# Patient Record
Sex: FEMALE | Race: BLACK OR AFRICAN AMERICAN | NOT HISPANIC OR LATINO
[De-identification: names, ages, dates, MRNs, and addresses within clinical notes are randomized per-mention and may not be internally consistent; named-entity substitution may affect disease eponyms.]

---

## 2017-10-26 ENCOUNTER — HOSPITAL (OUTPATIENT)
Dept: OTHER | Age: 37
End: 2017-10-26
Attending: INTERNAL MEDICINE

## 2018-09-10 PROCEDURE — 86200 CCP ANTIBODY: CPT | Performed by: FAMILY MEDICINE

## 2018-09-10 PROCEDURE — 86812 HLA TYPING A B OR C: CPT | Performed by: FAMILY MEDICINE

## 2018-09-20 PROCEDURE — 87624 HPV HI-RISK TYP POOLED RSLT: CPT | Performed by: FAMILY MEDICINE

## 2018-09-20 PROCEDURE — 88175 CYTOPATH C/V AUTO FLUID REDO: CPT | Performed by: FAMILY MEDICINE

## 2018-12-01 ENCOUNTER — PRIOR ORIGINAL RECORDS (OUTPATIENT)
Dept: HEALTH INFORMATION MANAGEMENT | Facility: OTHER | Age: 38
End: 2018-12-01

## 2019-09-08 ENCOUNTER — HOSPITAL (OUTPATIENT)
Dept: OTHER | Age: 39
End: 2019-09-08
Attending: HOSPITALIST

## 2019-09-08 LAB
A/G RATIO_: 1
ABS LYMPH: 1.4 K/CUMM (ref 1–3.5)
ABS MONO: 0.9 K/CUMM (ref 0.1–0.8)
ABS NEUTRO: 16.3 K/CUMM (ref 2–8)
ALBUMIN: 4.6 G/DL (ref 3.5–5)
ALK PHOS: 76 UNIT/L (ref 50–124)
ALT/GPT: 18 UNIT/L (ref 0–55)
ANION GAP SERPL CALC-SCNC: 20 MEQ/L (ref 10–20)
AST/GOT: 18 UNIT/L (ref 5–34)
BASOPHIL: 0 % (ref 0–1)
BILI TOTAL: 0.7 MG/DL (ref 0.2–1)
BUN SERPL-MCNC: 7 MG/DL (ref 6–20)
CALCIUM: 10.5 MG/DL (ref 8.4–10.2)
CHLORIDE: 97 MEQ/L (ref 97–107)
CONTROL LINE: PRESENT
CONTROL LINE: PRESENT
CREATININE: 0.81 MG/DL (ref 0.6–1.3)
DIFF_TYPE?: ABNORMAL
EOSINOPHIL: 0 % (ref 0–6)
GLOBULIN_: 4.6 G/DL (ref 2–4.1)
GLUCOSE LVL: 120 MG/DL (ref 70–99)
HCT VFR BLD CALC: 41 % (ref 33–45)
HEMOLYSIS 2+: ABNORMAL
HGB BLD-MCNC: 13.2 G/DL (ref 11–15)
ICTERIC 4+: NEGATIVE
IMMATURE GRAN: 0.7 % (ref 0–0.3)
INSTR WBC: 18.8 K/CUMM (ref 4–11)
LIPASE LEVEL: 10 UNIT/L (ref 8–78)
LIPEMIC 3+: NEGATIVE
LYMPHOCYTE: 7 %
Lab: CLEAR
Lab: CLEAR
MCH RBC QN AUTO: 26 PG (ref 25–35)
MCHC RBC AUTO-ENTMCNC: 32 G/DL (ref 32–37)
MCV RBC AUTO: 81 FL (ref 78–97)
MONOCYTE: 5 %
NEUTROPHIL: 86 %
NRBC BLD MANUAL-RTO: 0 % (ref 0–0.2)
PLATELET: 459 K/CUMM (ref 150–450)
PLT ESTIMATE: ADEQUATE
POTASSIUM: 4.2 MEQ/L (ref 3.5–5.1)
RBC # BLD: 5.12 M/CUMM (ref 3.7–5.2)
RBC MORPH: NORMAL
RDW: 15.4 % (ref 11.5–14.5)
SODIUM: 134 MEQ/L (ref 136–145)
TCO2: 21 MEQ/L (ref 19–29)
TOTAL PROTEIN: 9.2 G/DL (ref 6.4–8.3)
UA APPEAR: CLEAR
UA BACTERIA: ABNORMAL
UA BILI: NEGATIVE
UA BLOOD: ABNORMAL
UA COLOR: YELLOW
UA EPITHELIAL: ABNORMAL
UA GLUCOSE: NEGATIVE
UA KETONES: NEGATIVE
UA LEUK EST: NEGATIVE
UA NITRITE: NEGATIVE
UA PH: 6.5 (ref 5–7)
UA PROTEIN: NEGATIVE
UA RBC: ABNORMAL
UA SPEC GRAV: <=1.005 (ref 1.01–1.02)
UA UROBILINOGEN: 0.2 MG/DL (ref 0.2–1)
UA WBC: ABNORMAL
URINE PREG POC: NEGATIVE
URINE PREG POC: NEGATIVE
WBC # BLD: 18.8 K/CUMM (ref 4–11)

## 2019-09-09 LAB
ABS LYMPH: 1.1 K/CUMM (ref 1–3.5)
ABS MONO: 1.4 K/CUMM (ref 0.1–0.8)
ABS NEUTRO: 16.1 K/CUMM (ref 2–8)
ANION GAP SERPL CALC-SCNC: 13 MEQ/L (ref 10–20)
BASOPHIL: 0 % (ref 0–1)
BUN SERPL-MCNC: 8 MG/DL (ref 6–20)
CALCIUM: 8.9 MG/DL (ref 8.4–10.2)
CHLORIDE: 103 MEQ/L (ref 97–107)
CREATININE: 0.87 MG/DL (ref 0.6–1.3)
DIFF_TYPE?: ABNORMAL
EOSINOPHIL: 0 % (ref 0–6)
GLUCOSE LVL: 125 MG/DL (ref 70–99)
HCT VFR BLD CALC: 38 % (ref 33–45)
HEMOLYSIS 2+: NEGATIVE
HEMOLYSIS 2+: NEGATIVE
HEMOLYSIS 3+: NEGATIVE
HGB BLD-MCNC: 11.9 G/DL (ref 11–15)
IMMATURE GRAN: 0.8 % (ref 0–0.3)
INSTR WBC: 18.8 K/CUMM (ref 4–11)
LIPEMIC 4+: NEGATIVE
LYMPHOCYTE: 6 %
MAGNESIUM LEVEL: 1.9 MG/DL (ref 1.6–2.6)
MCH RBC QN AUTO: 26 PG (ref 25–35)
MCHC RBC AUTO-ENTMCNC: 32 G/DL (ref 32–37)
MCV RBC AUTO: 82 FL (ref 78–97)
MONOCYTE: 7 %
NEUTROPHIL: 86 %
NRBC BLD MANUAL-RTO: 0 % (ref 0–0.2)
PHOSPHORUS: 2.9 MG/DL (ref 2.3–4.7)
PLATELET: 395 K/CUMM (ref 150–450)
POTASSIUM: 4.4 MEQ/L (ref 3.5–5.1)
RBC # BLD: 4.6 M/CUMM (ref 3.7–5.2)
RDW: 15.5 % (ref 11.5–14.5)
SODIUM: 135 MEQ/L (ref 136–145)
TCO2: 23 MEQ/L (ref 19–29)
WBC # BLD: 18.8 K/CUMM (ref 4–11)

## 2019-09-09 PROCEDURE — 44970 LAPAROSCOPY APPENDECTOMY: CPT | Performed by: SURGERY

## 2019-09-09 PROCEDURE — 99222 1ST HOSP IP/OBS MODERATE 55: CPT | Performed by: HOSPITALIST

## 2019-09-09 PROCEDURE — 99253 IP/OBS CNSLTJ NEW/EST LOW 45: CPT | Performed by: SURGERY

## 2019-09-10 LAB
ABS LYMPH: 1.6 K/CUMM (ref 1–3.5)
ABS MONO: 1.2 K/CUMM (ref 0.1–0.8)
ABS NEUTRO: 12.6 K/CUMM (ref 2–8)
ANION GAP SERPL CALC-SCNC: 10 MEQ/L (ref 10–20)
BASOPHIL: 0 % (ref 0–1)
BUN SERPL-MCNC: 8 MG/DL (ref 6–20)
CALCIUM: 8.3 MG/DL (ref 8.4–10.2)
CHLORIDE: 109 MEQ/L (ref 97–107)
CREATININE: 0.8 MG/DL (ref 0.6–1.3)
DIFF_TYPE?: ABNORMAL
EOSINOPHIL: 0 % (ref 0–6)
GLUCOSE LVL: 120 MG/DL (ref 70–99)
HCT VFR BLD CALC: 32 % (ref 33–45)
HEMOLYSIS 2+: NEGATIVE
HEMOLYSIS 2+: NEGATIVE
HGB BLD-MCNC: 10 G/DL (ref 11–15)
IMMATURE GRAN: 0.6 % (ref 0–0.3)
INSTR WBC: 15.4 K/CUMM (ref 4–11)
LYMPHOCYTE: 10 %
MAGNESIUM LEVEL: 2.2 MG/DL (ref 1.6–2.6)
MCH RBC QN AUTO: 26 PG (ref 25–35)
MCHC RBC AUTO-ENTMCNC: 32 G/DL (ref 32–37)
MCV RBC AUTO: 82 FL (ref 78–97)
MONOCYTE: 8 %
NEUTROPHIL: 82 %
NRBC BLD MANUAL-RTO: 0 % (ref 0–0.2)
PLATELET: 342 K/CUMM (ref 150–450)
POTASSIUM: 4.2 MEQ/L (ref 3.5–5.1)
RBC # BLD: 3.86 M/CUMM (ref 3.7–5.2)
RDW: 15.9 % (ref 11.5–14.5)
SODIUM: 137 MEQ/L (ref 136–145)
TCO2: 22 MEQ/L (ref 19–29)
WBC # BLD: 15.4 K/CUMM (ref 4–11)

## 2019-09-10 PROCEDURE — 99233 SBSQ HOSP IP/OBS HIGH 50: CPT | Performed by: INTERNAL MEDICINE

## 2019-09-10 PROCEDURE — 99024 POSTOP FOLLOW-UP VISIT: CPT | Performed by: SURGERY

## 2019-09-11 ENCOUNTER — HOSPITAL (OUTPATIENT)
Dept: OTHER | Age: 39
End: 2019-09-11

## 2019-09-11 LAB
ABS LYMPH: 2.2 K/CUMM (ref 1–3.5)
ABS MONO: 0.7 K/CUMM (ref 0.1–0.8)
ABS NEUTRO: 5 K/CUMM (ref 2–8)
ANION GAP SERPL CALC-SCNC: 11 MEQ/L (ref 10–20)
BASOPHIL: 1 % (ref 0–1)
BUN SERPL-MCNC: 7 MG/DL (ref 6–20)
CALCIUM: 8.4 MG/DL (ref 8.4–10.2)
CHLORIDE: 107 MEQ/L (ref 97–107)
CREATININE: 0.78 MG/DL (ref 0.6–1.3)
DIFF_TYPE?: ABNORMAL
EOSINOPHIL: 3 % (ref 0–6)
GLUCOSE LVL: 99 MG/DL (ref 70–99)
HCT VFR BLD CALC: 33 % (ref 33–45)
HEMOLYSIS 2+: NEGATIVE
HEMOLYSIS 2+: NEGATIVE
HEMOLYSIS 3+: NEGATIVE
HGB BLD-MCNC: 10 G/DL (ref 11–15)
IMMATURE GRAN: 0.5 % (ref 0–0.3)
INSTR WBC: 8.2 K/CUMM (ref 4–11)
LIPEMIC 4+: NEGATIVE
LYMPHOCYTE: 26 %
MAGNESIUM LEVEL: 2 MG/DL (ref 1.6–2.6)
MCH RBC QN AUTO: 26 PG (ref 25–35)
MCHC RBC AUTO-ENTMCNC: 31 G/DL (ref 32–37)
MCV RBC AUTO: 84 FL (ref 78–97)
MONOCYTE: 8 %
NEUTROPHIL: 61 %
NRBC BLD MANUAL-RTO: 0 % (ref 0–0.2)
PHOSPHORUS: 3.1 MG/DL (ref 2.3–4.7)
PLATELET: 334 K/CUMM (ref 150–450)
POTASSIUM: 4.2 MEQ/L (ref 3.5–5.1)
RBC # BLD: 3.89 M/CUMM (ref 3.7–5.2)
RDW: 15.9 % (ref 11.5–14.5)
SODIUM: 136 MEQ/L (ref 136–145)
TCO2: 22 MEQ/L (ref 19–29)
WBC # BLD: 8.2 K/CUMM (ref 4–11)

## 2019-09-11 PROCEDURE — 99239 HOSP IP/OBS DSCHRG MGMT >30: CPT | Performed by: INTERNAL MEDICINE

## 2019-09-11 PROCEDURE — 99024 POSTOP FOLLOW-UP VISIT: CPT | Performed by: SURGERY

## 2019-09-18 ENCOUNTER — OFFICE VISIT (OUTPATIENT)
Dept: SURGERY | Age: 39
End: 2019-09-18

## 2019-09-18 VITALS — HEIGHT: 60 IN | BODY MASS INDEX: 39.46 KG/M2 | WEIGHT: 201 LBS

## 2019-09-18 DIAGNOSIS — K35.30 ACUTE APPENDICITIS WITH LOCALIZED PERITONITIS, WITHOUT PERFORATION, ABSCESS, OR GANGRENE: Primary | ICD-10-CM

## 2019-09-18 PROCEDURE — 99024 POSTOP FOLLOW-UP VISIT: CPT | Performed by: SURGERY

## 2019-09-18 RX ORDER — PHENAZOPYRIDINE HYDROCHLORIDE 100 MG/1
100 TABLET, FILM COATED ORAL
COMMUNITY
Start: 2014-11-26 | End: 2023-11-11 | Stop reason: ALTCHOICE

## 2019-09-18 RX ORDER — OXYBUTYNIN CHLORIDE 10 MG/1
10 TABLET, EXTENDED RELEASE ORAL DAILY
COMMUNITY
End: 2023-11-11

## 2019-09-18 RX ORDER — ALBUTEROL SULFATE 90 UG/1
AEROSOL, METERED RESPIRATORY (INHALATION)
COMMUNITY
Start: 2018-07-03 | End: 2023-11-11

## 2019-09-18 RX ORDER — TRAZODONE HYDROCHLORIDE 100 MG/1
50 TABLET ORAL
COMMUNITY

## 2019-09-18 RX ORDER — HYDROCODONE BITARTRATE AND ACETAMINOPHEN 5; 325 MG/1; MG/1
TABLET ORAL
Refills: 0 | COMMUNITY
Start: 2019-09-11 | End: 2023-11-11 | Stop reason: ALTCHOICE

## 2019-09-18 RX ORDER — DULOXETIN HYDROCHLORIDE 30 MG/1
30 CAPSULE, DELAYED RELEASE ORAL
COMMUNITY
Start: 2019-06-24 | End: 2023-11-11

## 2019-09-18 RX ORDER — MONTELUKAST SODIUM 10 MG/1
TABLET ORAL
COMMUNITY
Start: 2018-08-19 | End: 2023-11-11

## 2019-09-18 RX ORDER — GABAPENTIN 300 MG/1
900 CAPSULE ORAL
COMMUNITY
End: 2023-11-11

## 2020-07-08 PROBLEM — D64.9 ANEMIA: Status: ACTIVE | Noted: 2020-07-08

## 2020-07-08 PROBLEM — E78.5 DYSLIPIDEMIA (HIGH LDL; LOW HDL): Status: ACTIVE | Noted: 2020-07-08

## 2021-11-03 ENCOUNTER — LAB ENCOUNTER (OUTPATIENT)
Dept: LAB | Age: 41
End: 2021-11-03
Attending: ORTHOPAEDIC SURGERY
Payer: COMMERCIAL

## 2021-11-03 DIAGNOSIS — Z01.818 PREOP TESTING: ICD-10-CM

## 2021-11-03 RX ORDER — MULTIVIT-MIN/IRON FUM/FOLIC AC 7.5 MG-4
1 TABLET ORAL DAILY
COMMUNITY

## 2021-11-05 ENCOUNTER — ANESTHESIA (OUTPATIENT)
Dept: SURGERY | Facility: HOSPITAL | Age: 41
End: 2021-11-05
Payer: COMMERCIAL

## 2021-11-05 ENCOUNTER — HOSPITAL ENCOUNTER (OUTPATIENT)
Facility: HOSPITAL | Age: 41
Discharge: HOME HEALTH | End: 2021-11-06
Attending: ORTHOPAEDIC SURGERY | Admitting: ORTHOPAEDIC SURGERY
Payer: COMMERCIAL

## 2021-11-05 ENCOUNTER — APPOINTMENT (OUTPATIENT)
Dept: GENERAL RADIOLOGY | Facility: HOSPITAL | Age: 41
End: 2021-11-05
Attending: ORTHOPAEDIC SURGERY
Payer: COMMERCIAL

## 2021-11-05 ENCOUNTER — ANESTHESIA EVENT (OUTPATIENT)
Dept: SURGERY | Facility: HOSPITAL | Age: 41
End: 2021-11-05
Payer: COMMERCIAL

## 2021-11-05 DIAGNOSIS — M25.851 RIGHT HIP IMPINGEMENT SYNDROME: ICD-10-CM

## 2021-11-05 DIAGNOSIS — Z01.818 PREOP TESTING: Primary | ICD-10-CM

## 2021-11-05 PROBLEM — Z09 SURGICAL FOLLOW-UP CARE: Status: ACTIVE | Noted: 2021-11-05

## 2021-11-05 PROCEDURE — 81025 URINE PREGNANCY TEST: CPT

## 2021-11-05 PROCEDURE — 0QB44ZZ EXCISION OF RIGHT ACETABULUM, PERCUTANEOUS ENDOSCOPIC APPROACH: ICD-10-PCS | Performed by: ORTHOPAEDIC SURGERY

## 2021-11-05 PROCEDURE — 97116 GAIT TRAINING THERAPY: CPT

## 2021-11-05 PROCEDURE — 0SQ94ZZ REPAIR RIGHT HIP JOINT, PERCUTANEOUS ENDOSCOPIC APPROACH: ICD-10-PCS | Performed by: ORTHOPAEDIC SURGERY

## 2021-11-05 PROCEDURE — 0QB64ZZ EXCISION OF RIGHT UPPER FEMUR, PERCUTANEOUS ENDOSCOPIC APPROACH: ICD-10-PCS | Performed by: ORTHOPAEDIC SURGERY

## 2021-11-05 PROCEDURE — 97530 THERAPEUTIC ACTIVITIES: CPT

## 2021-11-05 PROCEDURE — 97161 PT EVAL LOW COMPLEX 20 MIN: CPT

## 2021-11-05 PROCEDURE — 76000 FLUOROSCOPY <1 HR PHYS/QHP: CPT | Performed by: ORTHOPAEDIC SURGERY

## 2021-11-05 DEVICE — QFIX 1.8 MINI SUTURE ANCHOR
Type: IMPLANTABLE DEVICE | Site: HIP | Status: FUNCTIONAL
Brand: Q-FIX

## 2021-11-05 RX ORDER — ONDANSETRON 2 MG/ML
4 INJECTION INTRAMUSCULAR; INTRAVENOUS EVERY 6 HOURS PRN
Status: DISCONTINUED | OUTPATIENT
Start: 2021-11-05 | End: 2021-11-06

## 2021-11-05 RX ORDER — ROCURONIUM BROMIDE 10 MG/ML
INJECTION, SOLUTION INTRAVENOUS AS NEEDED
Status: DISCONTINUED | OUTPATIENT
Start: 2021-11-05 | End: 2021-11-05 | Stop reason: SURG

## 2021-11-05 RX ORDER — HYDROCODONE BITARTRATE AND ACETAMINOPHEN 5; 325 MG/1; MG/1
2 TABLET ORAL EVERY 4 HOURS PRN
Status: DISCONTINUED | OUTPATIENT
Start: 2021-11-05 | End: 2021-11-06

## 2021-11-05 RX ORDER — HYDROMORPHONE HYDROCHLORIDE 1 MG/ML
0.2 INJECTION, SOLUTION INTRAMUSCULAR; INTRAVENOUS; SUBCUTANEOUS EVERY 5 MIN PRN
Status: DISCONTINUED | OUTPATIENT
Start: 2021-11-05 | End: 2021-11-05 | Stop reason: HOSPADM

## 2021-11-05 RX ORDER — HALOPERIDOL 5 MG/ML
0.25 INJECTION INTRAMUSCULAR ONCE AS NEEDED
Status: DISCONTINUED | OUTPATIENT
Start: 2021-11-05 | End: 2021-11-05 | Stop reason: HOSPADM

## 2021-11-05 RX ORDER — ONDANSETRON 4 MG/1
4 TABLET, FILM COATED ORAL EVERY 8 HOURS PRN
Status: DISCONTINUED | OUTPATIENT
Start: 2021-11-05 | End: 2021-11-06

## 2021-11-05 RX ORDER — DOCUSATE SODIUM 100 MG/1
100 CAPSULE, LIQUID FILLED ORAL 2 TIMES DAILY
Status: DISCONTINUED | OUTPATIENT
Start: 2021-11-06 | End: 2021-11-06

## 2021-11-05 RX ORDER — MORPHINE SULFATE 4 MG/ML
4 INJECTION, SOLUTION INTRAMUSCULAR; INTRAVENOUS EVERY 10 MIN PRN
Status: DISCONTINUED | OUTPATIENT
Start: 2021-11-05 | End: 2021-11-05 | Stop reason: HOSPADM

## 2021-11-05 RX ORDER — HYDROCODONE BITARTRATE AND ACETAMINOPHEN 5; 325 MG/1; MG/1
1 TABLET ORAL EVERY 4 HOURS PRN
Status: DISCONTINUED | OUTPATIENT
Start: 2021-11-05 | End: 2021-11-06

## 2021-11-05 RX ORDER — HYDROCODONE BITARTRATE AND ACETAMINOPHEN 5; 325 MG/1; MG/1
2 TABLET ORAL AS NEEDED
Status: COMPLETED | OUTPATIENT
Start: 2021-11-05 | End: 2021-11-05

## 2021-11-05 RX ORDER — DEXAMETHASONE SODIUM PHOSPHATE 4 MG/ML
VIAL (ML) INJECTION AS NEEDED
Status: DISCONTINUED | OUTPATIENT
Start: 2021-11-05 | End: 2021-11-05 | Stop reason: SURG

## 2021-11-05 RX ORDER — ASPIRIN 81 MG/1
81 TABLET ORAL DAILY
Status: DISCONTINUED | OUTPATIENT
Start: 2021-11-05 | End: 2021-11-06

## 2021-11-05 RX ORDER — HYDROMORPHONE HYDROCHLORIDE 1 MG/ML
0.6 INJECTION, SOLUTION INTRAMUSCULAR; INTRAVENOUS; SUBCUTANEOUS EVERY 5 MIN PRN
Status: DISCONTINUED | OUTPATIENT
Start: 2021-11-05 | End: 2021-11-05 | Stop reason: HOSPADM

## 2021-11-05 RX ORDER — GLYCOPYRROLATE 0.2 MG/ML
INJECTION, SOLUTION INTRAMUSCULAR; INTRAVENOUS AS NEEDED
Status: DISCONTINUED | OUTPATIENT
Start: 2021-11-05 | End: 2021-11-05 | Stop reason: SURG

## 2021-11-05 RX ORDER — ONDANSETRON 2 MG/ML
4 INJECTION INTRAMUSCULAR; INTRAVENOUS ONCE AS NEEDED
Status: DISCONTINUED | OUTPATIENT
Start: 2021-11-05 | End: 2021-11-05 | Stop reason: HOSPADM

## 2021-11-05 RX ORDER — PROCHLORPERAZINE EDISYLATE 5 MG/ML
5 INJECTION INTRAMUSCULAR; INTRAVENOUS ONCE AS NEEDED
Status: DISCONTINUED | OUTPATIENT
Start: 2021-11-05 | End: 2021-11-05 | Stop reason: HOSPADM

## 2021-11-05 RX ORDER — FAMOTIDINE 20 MG/1
20 TABLET ORAL ONCE
Status: COMPLETED | OUTPATIENT
Start: 2021-11-05 | End: 2021-11-05

## 2021-11-05 RX ORDER — INDOMETHACIN 50 MG/1
50 CAPSULE ORAL DAILY
Status: DISCONTINUED | OUTPATIENT
Start: 2021-11-05 | End: 2021-11-06

## 2021-11-05 RX ORDER — NALOXONE HYDROCHLORIDE 0.4 MG/ML
80 INJECTION, SOLUTION INTRAMUSCULAR; INTRAVENOUS; SUBCUTANEOUS AS NEEDED
Status: DISCONTINUED | OUTPATIENT
Start: 2021-11-05 | End: 2021-11-05 | Stop reason: HOSPADM

## 2021-11-05 RX ORDER — LIDOCAINE HYDROCHLORIDE 10 MG/ML
INJECTION, SOLUTION EPIDURAL; INFILTRATION; INTRACAUDAL; PERINEURAL AS NEEDED
Status: DISCONTINUED | OUTPATIENT
Start: 2021-11-05 | End: 2021-11-05 | Stop reason: SURG

## 2021-11-05 RX ORDER — BUPIVACAINE HYDROCHLORIDE 5 MG/ML
INJECTION, SOLUTION EPIDURAL; INTRACAUDAL AS NEEDED
Status: DISCONTINUED | OUTPATIENT
Start: 2021-11-05 | End: 2021-11-05 | Stop reason: HOSPADM

## 2021-11-05 RX ORDER — TRAZODONE HYDROCHLORIDE 100 MG/1
200 TABLET ORAL NIGHTLY
Status: DISCONTINUED | OUTPATIENT
Start: 2021-11-05 | End: 2021-11-06

## 2021-11-05 RX ORDER — ACETAMINOPHEN 500 MG
1000 TABLET ORAL ONCE
Status: COMPLETED | OUTPATIENT
Start: 2021-11-05 | End: 2021-11-05

## 2021-11-05 RX ORDER — MORPHINE SULFATE 2 MG/ML
0.2 INJECTION, SOLUTION INTRAMUSCULAR; INTRAVENOUS EVERY 2 HOUR PRN
Status: DISCONTINUED | OUTPATIENT
Start: 2021-11-05 | End: 2021-11-06

## 2021-11-05 RX ORDER — CEFAZOLIN SODIUM/WATER 2 G/20 ML
2 SYRINGE (ML) INTRAVENOUS ONCE
Status: COMPLETED | OUTPATIENT
Start: 2021-11-05 | End: 2021-11-05

## 2021-11-05 RX ORDER — ONDANSETRON 2 MG/ML
INJECTION INTRAMUSCULAR; INTRAVENOUS AS NEEDED
Status: DISCONTINUED | OUTPATIENT
Start: 2021-11-05 | End: 2021-11-05 | Stop reason: SURG

## 2021-11-05 RX ORDER — PHENYLEPHRINE HCL 10 MG/ML
VIAL (ML) INJECTION AS NEEDED
Status: DISCONTINUED | OUTPATIENT
Start: 2021-11-05 | End: 2021-11-05 | Stop reason: SURG

## 2021-11-05 RX ORDER — MIDAZOLAM HYDROCHLORIDE 1 MG/ML
INJECTION INTRAMUSCULAR; INTRAVENOUS AS NEEDED
Status: DISCONTINUED | OUTPATIENT
Start: 2021-11-05 | End: 2021-11-05 | Stop reason: SURG

## 2021-11-05 RX ORDER — HYDROCODONE BITARTRATE AND ACETAMINOPHEN 5; 325 MG/1; MG/1
1 TABLET ORAL AS NEEDED
Status: COMPLETED | OUTPATIENT
Start: 2021-11-05 | End: 2021-11-05

## 2021-11-05 RX ORDER — CYCLOBENZAPRINE HCL 10 MG
10 TABLET ORAL 3 TIMES DAILY PRN
Status: DISCONTINUED | OUTPATIENT
Start: 2021-11-05 | End: 2021-11-06

## 2021-11-05 RX ORDER — NEOSTIGMINE METHYLSULFATE 1 MG/ML
INJECTION INTRAVENOUS AS NEEDED
Status: DISCONTINUED | OUTPATIENT
Start: 2021-11-05 | End: 2021-11-05 | Stop reason: SURG

## 2021-11-05 RX ORDER — HYDROMORPHONE HYDROCHLORIDE 1 MG/ML
0.4 INJECTION, SOLUTION INTRAMUSCULAR; INTRAVENOUS; SUBCUTANEOUS EVERY 5 MIN PRN
Status: DISCONTINUED | OUTPATIENT
Start: 2021-11-05 | End: 2021-11-05 | Stop reason: HOSPADM

## 2021-11-05 RX ORDER — SODIUM CHLORIDE, SODIUM LACTATE, POTASSIUM CHLORIDE, CALCIUM CHLORIDE 600; 310; 30; 20 MG/100ML; MG/100ML; MG/100ML; MG/100ML
INJECTION, SOLUTION INTRAVENOUS CONTINUOUS
Status: DISCONTINUED | OUTPATIENT
Start: 2021-11-05 | End: 2021-11-06

## 2021-11-05 RX ORDER — MORPHINE SULFATE 2 MG/ML
0.4 INJECTION, SOLUTION INTRAMUSCULAR; INTRAVENOUS EVERY 2 HOUR PRN
Status: DISCONTINUED | OUTPATIENT
Start: 2021-11-05 | End: 2021-11-06

## 2021-11-05 RX ORDER — MORPHINE SULFATE 10 MG/ML
6 INJECTION, SOLUTION INTRAMUSCULAR; INTRAVENOUS EVERY 10 MIN PRN
Status: DISCONTINUED | OUTPATIENT
Start: 2021-11-05 | End: 2021-11-05 | Stop reason: HOSPADM

## 2021-11-05 RX ORDER — MORPHINE SULFATE 4 MG/ML
2 INJECTION, SOLUTION INTRAMUSCULAR; INTRAVENOUS EVERY 10 MIN PRN
Status: DISCONTINUED | OUTPATIENT
Start: 2021-11-05 | End: 2021-11-05 | Stop reason: HOSPADM

## 2021-11-05 RX ADMIN — PHENYLEPHRINE HCL 100 MCG: 10 MG/ML VIAL (ML) INJECTION at 11:35:00

## 2021-11-05 RX ADMIN — SODIUM CHLORIDE, SODIUM LACTATE, POTASSIUM CHLORIDE, CALCIUM CHLORIDE: 600; 310; 30; 20 INJECTION, SOLUTION INTRAVENOUS at 10:47:00

## 2021-11-05 RX ADMIN — LIDOCAINE HYDROCHLORIDE 50 MG: 10 INJECTION, SOLUTION EPIDURAL; INFILTRATION; INTRACAUDAL; PERINEURAL at 08:54:00

## 2021-11-05 RX ADMIN — PHENYLEPHRINE HCL 100 MCG: 10 MG/ML VIAL (ML) INJECTION at 11:29:00

## 2021-11-05 RX ADMIN — ROCURONIUM BROMIDE 10 MG: 10 INJECTION, SOLUTION INTRAVENOUS at 11:36:00

## 2021-11-05 RX ADMIN — PHENYLEPHRINE HCL 100 MCG: 10 MG/ML VIAL (ML) INJECTION at 10:28:00

## 2021-11-05 RX ADMIN — PHENYLEPHRINE HCL 100 MCG: 10 MG/ML VIAL (ML) INJECTION at 09:47:00

## 2021-11-05 RX ADMIN — ROCURONIUM BROMIDE 10 MG: 10 INJECTION, SOLUTION INTRAVENOUS at 10:57:00

## 2021-11-05 RX ADMIN — SODIUM CHLORIDE, SODIUM LACTATE, POTASSIUM CHLORIDE, CALCIUM CHLORIDE: 600; 310; 30; 20 INJECTION, SOLUTION INTRAVENOUS at 08:50:00

## 2021-11-05 RX ADMIN — NEOSTIGMINE METHYLSULFATE 4 MG: 1 INJECTION INTRAVENOUS at 12:03:00

## 2021-11-05 RX ADMIN — ROCURONIUM BROMIDE 5 MG: 10 INJECTION, SOLUTION INTRAVENOUS at 08:54:00

## 2021-11-05 RX ADMIN — PHENYLEPHRINE HCL 100 MCG: 10 MG/ML VIAL (ML) INJECTION at 11:57:00

## 2021-11-05 RX ADMIN — ROCURONIUM BROMIDE 10 MG: 10 INJECTION, SOLUTION INTRAVENOUS at 09:34:00

## 2021-11-05 RX ADMIN — DEXAMETHASONE SODIUM PHOSPHATE 4 MG: 4 MG/ML VIAL (ML) INJECTION at 09:14:00

## 2021-11-05 RX ADMIN — CEFAZOLIN SODIUM/WATER 2 G: 2 G/20 ML SYRINGE (ML) INTRAVENOUS at 09:10:00

## 2021-11-05 RX ADMIN — PHENYLEPHRINE HCL 100 MCG: 10 MG/ML VIAL (ML) INJECTION at 10:02:00

## 2021-11-05 RX ADMIN — MIDAZOLAM HYDROCHLORIDE 2 MG: 1 INJECTION INTRAMUSCULAR; INTRAVENOUS at 08:50:00

## 2021-11-05 RX ADMIN — PHENYLEPHRINE HCL 100 MCG: 10 MG/ML VIAL (ML) INJECTION at 10:59:00

## 2021-11-05 RX ADMIN — ROCURONIUM BROMIDE 35 MG: 10 INJECTION, SOLUTION INTRAVENOUS at 09:06:00

## 2021-11-05 RX ADMIN — PHENYLEPHRINE HCL 100 MCG: 10 MG/ML VIAL (ML) INJECTION at 09:12:00

## 2021-11-05 RX ADMIN — PHENYLEPHRINE HCL 100 MCG: 10 MG/ML VIAL (ML) INJECTION at 11:12:00

## 2021-11-05 RX ADMIN — PHENYLEPHRINE HCL 100 MCG: 10 MG/ML VIAL (ML) INJECTION at 10:47:00

## 2021-11-05 RX ADMIN — ONDANSETRON 4 MG: 2 INJECTION INTRAMUSCULAR; INTRAVENOUS at 09:33:00

## 2021-11-05 RX ADMIN — ROCURONIUM BROMIDE 10 MG: 10 INJECTION, SOLUTION INTRAVENOUS at 10:03:00

## 2021-11-05 RX ADMIN — GLYCOPYRROLATE 0.8 MG: 0.2 INJECTION, SOLUTION INTRAMUSCULAR; INTRAVENOUS at 12:03:00

## 2021-11-05 RX ADMIN — PHENYLEPHRINE HCL 100 MCG: 10 MG/ML VIAL (ML) INJECTION at 09:35:00

## 2021-11-05 NOTE — ANESTHESIA POSTPROCEDURE EVALUATION
Patient: Nakul Sanchez    Procedure Summary     Date: 11/05/21 Room / Location: [address] [place] [address] OR 08 / [address] [place] [address] OR    Anesthesia Start: 0848 Anesthesia Stop:     Procedure: RIGHT HIP ARTHROSCOPY WITH LABRAL DEBRIDEMENT/REPAIR, FEMOROPLASTY AND CAPSULAR CLOSUR

## 2021-11-05 NOTE — OPERATIVE REPORT
Operative Note      Date: 11/05/21    Patient Name: Rubina Mora       [id number]    Preoperative Diagnosis:   1. Right HIP LABRAL TEAR  2. Right HIP CAM IMPINGEMENT    Postoperative Diagnosis:  1. Right HIP LABRAL TEAR  2.  Right HIP CAM IMPINGEMENT hyperemia    Interportal cut the capsule was subsequently made. The anterosuperior labral tear was debrided using a shaver as well as a chondroplasty was performed to smooth articular surfaces and free of any loose chondral fragments.   Electrocautery was reapproximate the interportal cut. Subsequently skin was closed with buried 3-0 Monocryl sutures and a 3-0 nylon sutures. 30 cc of 0.5% marcaine with epinephrine were injected into the surgical field and portal sites.   A sterile dressing was then applied

## 2021-11-05 NOTE — PHYSICAL THERAPY NOTE
PHYSICAL THERAPY EVALUATION - INPATIENT     Room Number: Room 3/Room 3-A  Evaluation Date: 11/5/2021  Type of Evaluation: Initial   Physician Order: PT Eval and Treat    Presenting Problem: s/p R hip labral debridement & repair by Dr. Brock Holly     Reason for Answered and addressed all pt's questions and concerns. Pt was left in bed with all needs in reach, RN aware and present.      The patient's Approx Degree of Impairment: 50.57% has been calculated based on documentation in the [facility] '6 clicks' Inpatient Bas APPENDECTOMY     •      • ESOPH ENDOSCOPY W/US EXAM  2018   • LAPAROSCOPY, GASTRIC RESTRICTIVE PROCEDURE, WITH GASTRIC BYPASS FOR MORBID  2021    Gastric Sleeve       HOME SITUATION  Home Situation  Type of Home: House  Home Layout: Multi and from a bed to a chair (including a wheelchair)?: A Little   Help from Another: Need to walk in hospital room?: A Little   Help from Another: Climbing 3-5 steps with a railing?: A Lot     AM-PAC Score:  Raw Score: 17   Approx Degree of Impairment: 50.57

## 2021-11-05 NOTE — H&P
ORTHO VISIT NOTE       Lillian Morris - MRN: [id number]  [facility]       HISTORY OF PRESENT ILLNESS:   The patient is a 42year old year old female who follows up today for discussion of her MRI with steroid injection into her right hip f route once., Disp: , Rfl:           Omeprazole              RASH  Pantoprazole            RASH    Social History    Socioeconomic History      Marital status: Single      Spouse name: Not on file      Number of children: Not on file      Years of education Stability:       Unable to Pay for Housing in the Last Year: Not on file      Number of Places Lived in the Last Year: Not on file      Unstable Housing in the Last Year: Not on file      PHYSICAL EXAMINATION:   No physical exam done during this telephone

## 2021-11-05 NOTE — ANESTHESIA PREPROCEDURE EVALUATION
Anesthesia PreOp Note    HPI:     Elizabeth Slater is a 42year old female who presents for preoperative consultation requested by:  John Antoine MD    Date of Surgery: 11/5/2021    Procedure(s):  RIGHT HIP ARTHROSCOPY WITH LABRAL DEBRIDEMENT/REPAIR, FEMORO mouth once a week.  Labs prior to refill, ordered in June., Disp: 12 capsule, Rfl: 0, 11/2/2021  MONTELUKAST SODIUM 10 MG Oral Tab, TAKE 1 TABLET BY MOUTH EVERY DAY AT NIGHT, Disp: 90 tablet, Rfl: 0, Past Month at Unknown time  TRAZODONE 100 MG Oral Tab, TA Year: Not on file      Ran Out of Food in the Last Year: Not on file  Transportation Needs:       Lack of Transportation (Medical): Not on file      Lack of Transportation (Non-Medical):  Not on file  Physical Activity:       Days of Exercise per Week: Not Anesthesia Plan:   ASA:  1  Plan:   General  Post-op Pain Management: IV analgesics and Oral pain medication  Informed Consent Plan and Risks Discussed With:  Patient      I have informed [address] [place] and/or legal guardian or family member of the ron

## 2021-11-05 NOTE — PLAN OF CARE
Report given to kale lopez. Patient and family updated on room number and plan. Transport requested and patient ready to go.

## 2021-11-05 NOTE — BRIEF OP NOTE
Pre-Operative Diagnosis: Right hip impingement syndrome [M25.851]     Post-Operative Diagnosis: Right hip impingement syndrome [M25.851]      Procedure Performed:   RIGHT HIP ARTHROSCOPY WITH LABRAL DEBRIDEMENT/REPAIR, FEMOROPLASTY AND CAPSULAR CLOSURE

## 2021-11-05 NOTE — ANESTHESIA PROCEDURE NOTES
Airway  Date/Time: 11/5/2021 8:56 AM  Urgency: Elective      General Information and Staff    Patient location during procedure: OR  Anesthesiologist: Marisela Farr MD  Resident/CRNA: Nhi Tamayo CRNA  Performed: anesthesiologist and CRNA

## 2021-11-05 NOTE — PACU NOTE
Pt arrived alert, brace intact to R hip, drsgs intact, n/v checks to bilat LE intact. Pt mom stated pt had \"26\" stairs to get to her bedroom at home. Pt states Dr Vijay Bhatti aware and said he would discuss that post-op with her.     On further questions, pt q

## 2021-11-06 VITALS
TEMPERATURE: 98 F | WEIGHT: 181 LBS | HEIGHT: 60 IN | HEART RATE: 78 BPM | DIASTOLIC BLOOD PRESSURE: 72 MMHG | SYSTOLIC BLOOD PRESSURE: 108 MMHG | OXYGEN SATURATION: 99 % | BODY MASS INDEX: 35.53 KG/M2 | RESPIRATION RATE: 16 BRPM

## 2021-11-06 PROCEDURE — 97110 THERAPEUTIC EXERCISES: CPT

## 2021-11-06 PROCEDURE — 97116 GAIT TRAINING THERAPY: CPT

## 2021-11-06 PROCEDURE — 97530 THERAPEUTIC ACTIVITIES: CPT

## 2021-11-06 NOTE — PROGRESS NOTES
Rebel Nascimento has been updated on the discharge plan- reviewed discharge instruction w her and mother. Incision care, pain meds, and MD zabala up reviewed. patient has walker and crutches, hinged brace to wear all times. all questions answered.  Pain scripts sent t

## 2021-11-06 NOTE — PROGRESS NOTES
Patient received from Pre-op recovery after surgery. Admitted to 1E related to failed attempt at stairs with PT and numbness in the left foot.  Patient reports \"it feels asleep\"  Patient received with right hip brace, nonweight baring orders, and made com

## 2021-11-06 NOTE — PHYSICAL THERAPY NOTE
PHYSICAL THERAPY HIP TREATMENT NOTE - INPATIENT    Room Number: Room 3/Room 3-A            Presenting Problem: s/p R hip labral debridement & repair by Dr. Chris Palma       Problem List  Active Problems:    Surgical follow-up care    Preop testing      PHYSICAL blankets)?: A Little   Patient Difficulty: Sitting down on and standing up from a chair with arms (e.g., wheelchair, bedside commode, etc.): A Little   Patient Difficulty: Moving from lying on back to sitting on the side of the bed?: A Little   How much he assistance level: modified independent with least restrictive AD     Goal #2  Current Status SBA   Goal #3 Patient is able to ambulate 250 feet with least restrictive assistive device: at assistance level: modified independent   Goal #3   Current Status 45

## 2021-11-06 NOTE — DISCHARGE SUMMARY
General Medicine Discharge Summary     Patient ID:  Kolton Sanchez  42year old  7/13/1980    Admit date: 11/5/2021    Discharge date and time: 11/6/21    Attending Physician: Silvia Coulter MD     [facility] CHANGED    Multiple Vitamins-Minerals (MULTI-VITAMIN/MINERALS) Oral Tab  Take 1 tablet by mouth daily. Thiamine HCl (VITAMIN B-1 OR)  Take by mouth.     IRON OR      ergocalciferol 1.25 MG (52585 UT) Oral Cap  Take 1 capsule (50,000 Units total) by mouth

## 2021-11-06 NOTE — CM/SW NOTE
11/06/21 1200   Discharge disposition   Expected discharge disposition Home-Health   Post Acute Care Provider   (Kaleb Crowell)   Discharge transportation Private car       Pt discussed during nursing rounds. Pt is stable for dc today. MD dc order entered.

## 2021-11-06 NOTE — H&P
[facility] Hospitalist H&P       CC: No chief complaint on file.        PCP: Marlyn Flores MD    History of Present Illness:   42year old female with history of hyperlipdemia, asthma, anxiety, who is here for elective right total hip arthrop (1.524 m)   Wt 181 lb (82.1 kg)   LMP 10/30/2021 (Exact Date)   SpO2 99%   BMI 35.35 kg/m²   General: Alert, no acute distress  Lungs: clear to ausculation bilaterally  Heart: Regular rate and rhythm  Abdomen: soft, non tender  Extremities: No edema  Skin:

## 2021-11-06 NOTE — CM/SW NOTE
11/06/21 1200   CM/SW Referral Data   Referral Source Physician;Nurse   Reason for Referral Discharge planning   Informant Patient   Pertinent Medical Hx   Does patient have an established PCP?   Isabela Whitney)   Significant Past Medical/Mental Healt

## 2022-11-19 ENCOUNTER — WALK IN (OUTPATIENT)
Dept: URGENT CARE | Age: 42
End: 2022-11-19
Attending: FAMILY MEDICINE

## 2022-11-19 VITALS
TEMPERATURE: 98.3 F | OXYGEN SATURATION: 100 % | HEIGHT: 60 IN | BODY MASS INDEX: 32.39 KG/M2 | DIASTOLIC BLOOD PRESSURE: 82 MMHG | HEART RATE: 85 BPM | WEIGHT: 165 LBS | SYSTOLIC BLOOD PRESSURE: 133 MMHG | RESPIRATION RATE: 12 BRPM

## 2022-11-19 DIAGNOSIS — H10.31 ACUTE CONJUNCTIVITIS OF RIGHT EYE, UNSPECIFIED ACUTE CONJUNCTIVITIS TYPE: Primary | ICD-10-CM

## 2022-11-19 PROCEDURE — 99202 OFFICE O/P NEW SF 15 MIN: CPT

## 2022-11-19 RX ORDER — TRAZODONE HYDROCHLORIDE 50 MG/1
TABLET ORAL
COMMUNITY
End: 2023-11-11 | Stop reason: ALTCHOICE

## 2022-11-19 RX ORDER — TOBRAMYCIN 3 MG/ML
1 SOLUTION/ DROPS OPHTHALMIC EVERY 4 HOURS
Qty: 5 ML | Refills: 0 | Status: SHIPPED | OUTPATIENT
Start: 2022-11-19 | End: 2022-11-24

## 2022-11-19 RX ORDER — MONTELUKAST SODIUM 10 MG/1
TABLET ORAL EVERY 24 HOURS
COMMUNITY
End: 2023-11-11

## 2022-11-19 ASSESSMENT — ENCOUNTER SYMPTOMS
RESPIRATORY NEGATIVE: 1
NEUROLOGICAL NEGATIVE: 1
PSYCHIATRIC NEGATIVE: 1
ENDOCRINE NEGATIVE: 1
EYE ITCHING: 1
HEMATOLOGIC/LYMPHATIC NEGATIVE: 1
GASTROINTESTINAL NEGATIVE: 1
EYE PAIN: 0
PHOTOPHOBIA: 0
CONSTITUTIONAL NEGATIVE: 1
EYE DISCHARGE: 1
EYE REDNESS: 1

## 2022-11-19 ASSESSMENT — PAIN SCALES - GENERAL: PAINLEVEL: 0

## 2023-11-11 ENCOUNTER — WALK IN (OUTPATIENT)
Dept: URGENT CARE | Age: 43
End: 2023-11-11
Attending: EMERGENCY MEDICINE

## 2023-11-11 VITALS
BODY MASS INDEX: 32.98 KG/M2 | DIASTOLIC BLOOD PRESSURE: 80 MMHG | TEMPERATURE: 98.2 F | RESPIRATION RATE: 16 BRPM | OXYGEN SATURATION: 99 % | WEIGHT: 168 LBS | HEART RATE: 87 BPM | SYSTOLIC BLOOD PRESSURE: 122 MMHG | HEIGHT: 60 IN

## 2023-11-11 DIAGNOSIS — R51.9 SINUS HEADACHE: Primary | ICD-10-CM

## 2023-11-11 PROCEDURE — 99212 OFFICE O/P EST SF 10 MIN: CPT

## 2023-11-11 RX ORDER — AMOXICILLIN 875 MG/1
875 TABLET, COATED ORAL 2 TIMES DAILY
Qty: 20 TABLET | Refills: 0 | Status: SHIPPED | OUTPATIENT
Start: 2023-11-11 | End: 2023-11-21

## 2023-11-11 ASSESSMENT — ENCOUNTER SYMPTOMS
SHORTNESS OF BREATH: 0
CHILLS: 0
CONFUSION: 0
COUGH: 0
HEADACHES: 0
ABDOMINAL PAIN: 0
SINUS PAIN: 1
BACK PAIN: 0
VOMITING: 0
FEVER: 0
SINUS PRESSURE: 1
NAUSEA: 0

## 2023-11-11 ASSESSMENT — PAIN SCALES - GENERAL: PAINLEVEL: 6

## (undated) DEVICE — (6) MINITAPE (COBRAID WHITE) 39.5: Brand: MINITAPE

## (undated) DEVICE — Device

## (undated) DEVICE — 4.5 MM LONG BONECUTTER SHAVER                                    BLADES, 18 CM WORKING LENGTH,                                    PACKAGE OF 3

## (undated) DEVICE — GOWN SURG AERO BLUE PERF XXLG

## (undated) DEVICE — GAMMEX® PI HYBRID SIZE 9, STERILE POWDER-FREE SURGICAL GLOVE, POLYISOPRENE AND NEOPRENE BLEND: Brand: GAMMEX

## (undated) DEVICE — UC HIP DISTRACTOR SUPINE

## (undated) DEVICE — SUTURE MONOCRYL 3-0 Y497G

## (undated) DEVICE — CLEAR-TRAC COMPLETE HIP CANNULA                                    8.5 MM X 110 MM: Brand: CLEAR-TRAC

## (undated) DEVICE — CONTAINER SPEC STR 4OZ GRY LID

## (undated) DEVICE — TUBING DW OUTFLOW

## (undated) DEVICE — INSTRUMENT ORTHOPEDIC HIP

## (undated) DEVICE — 5.5 MM LONG ABRADER SHAVER BLADES                                    AND BURRS, POWER/EP-1, 18 CM WORKING                                    LENGTH,BLACK,PACKAGED 3 PER BOX, STERILE

## (undated) DEVICE — GOWN SURG AERO BLUE PERF XLG

## (undated) DEVICE — DRL SURG FLEX CURV OD1.8MM DSP

## (undated) DEVICE — 1010 S-DRAPE TOWEL DRAPE 10/BX: Brand: STERI-DRAPE™

## (undated) DEVICE — HIP PINNING: Brand: MEDLINE INDUSTRIES, INC.

## (undated) DEVICE — TUBING IRR 16FT CNT WV 3 ASCP

## (undated) DEVICE — GAMMEX® PI HYBRID SIZE 7, STERILE POWDER-FREE SURGICAL GLOVE, POLYISOPRENE AND NEOPRENE BLEND: Brand: GAMMEX

## (undated) DEVICE — (6) MINITAPE (COBRAID BLUE) 39.5: Brand: MINITAPE

## (undated) DEVICE — 60 ML SYRINGE LUER-LOCK TIP: Brand: MONOJECT

## (undated) DEVICE — PERINEAL POST PAD 1

## (undated) DEVICE — DRAPE SHEET LG

## (undated) DEVICE — NEEDLE HPO 18GA 1.5IN ECLPS

## (undated) DEVICE — ACCU-PASS DIRECT CRESCENT XL: Brand: ACCU-PASS

## (undated) DEVICE — MEDI-VAC NON-CONDUCTIVE SUCTION TUBING: Brand: CARDINAL HEALTH

## (undated) DEVICE — SOL  .9 3000ML

## (undated) DEVICE — AMBIENT HIPVAC 50 IFS: Brand: AMBIENT

## (undated) DEVICE — SUTURE VICRYL 2-0 FS-1

## (undated) DEVICE — DISPOSABLE HIB PAC INCLUDES 3                                    GUIDEWIRES AND 2 ARTHROSCOPY NEEDLES

## (undated) DEVICE — CHLORAPREP 26ML APPLICATOR

## (undated) DEVICE — 3M™ MICROFOAM™ TAPE 1528-4: Brand: 3M™ MICROFOAM™